# Patient Record
Sex: FEMALE | Race: WHITE | NOT HISPANIC OR LATINO
[De-identification: names, ages, dates, MRNs, and addresses within clinical notes are randomized per-mention and may not be internally consistent; named-entity substitution may affect disease eponyms.]

---

## 2019-06-04 ENCOUNTER — FORM ENCOUNTER (OUTPATIENT)
Age: 64
End: 2019-06-04

## 2019-12-01 ENCOUNTER — FORM ENCOUNTER (OUTPATIENT)
Age: 64
End: 2019-12-01

## 2019-12-03 ENCOUNTER — FORM ENCOUNTER (OUTPATIENT)
Age: 64
End: 2019-12-03

## 2020-06-08 ENCOUNTER — FORM ENCOUNTER (OUTPATIENT)
Age: 65
End: 2020-06-08

## 2020-06-16 ENCOUNTER — FORM ENCOUNTER (OUTPATIENT)
Age: 65
End: 2020-06-16

## 2020-12-15 ENCOUNTER — FORM ENCOUNTER (OUTPATIENT)
Age: 65
End: 2020-12-15

## 2021-06-23 ENCOUNTER — APPOINTMENT (OUTPATIENT)
Dept: BREAST CENTER | Facility: CLINIC | Age: 66
End: 2021-06-23

## 2021-07-07 ENCOUNTER — RESULT REVIEW (OUTPATIENT)
Age: 66
End: 2021-07-07

## 2021-07-07 ENCOUNTER — APPOINTMENT (OUTPATIENT)
Dept: BREAST CENTER | Facility: CLINIC | Age: 66
End: 2021-07-07
Payer: COMMERCIAL

## 2021-07-07 VITALS
DIASTOLIC BLOOD PRESSURE: 79 MMHG | SYSTOLIC BLOOD PRESSURE: 133 MMHG | WEIGHT: 185 LBS | HEART RATE: 68 BPM | BODY MASS INDEX: 34.93 KG/M2 | HEIGHT: 61 IN

## 2021-07-07 DIAGNOSIS — Z86.79 PERSONAL HISTORY OF OTHER DISEASES OF THE CIRCULATORY SYSTEM: ICD-10-CM

## 2021-07-07 DIAGNOSIS — Z80.8 FAMILY HISTORY OF MALIGNANT NEOPLASM OF OTHER ORGANS OR SYSTEMS: ICD-10-CM

## 2021-07-07 DIAGNOSIS — N60.12 DIFFUSE CYSTIC MASTOPATHY OF LEFT BREAST: ICD-10-CM

## 2021-07-07 DIAGNOSIS — N60.11 DIFFUSE CYSTIC MASTOPATHY OF LEFT BREAST: ICD-10-CM

## 2021-07-07 DIAGNOSIS — Z80.3 FAMILY HISTORY OF MALIGNANT NEOPLASM OF BREAST: ICD-10-CM

## 2021-07-07 PROCEDURE — 99072 ADDL SUPL MATRL&STAF TM PHE: CPT

## 2021-07-07 PROCEDURE — 99213 OFFICE O/P EST LOW 20 MIN: CPT

## 2021-07-07 RX ORDER — BETAXOLOL HCL 20 MG
20 TABLET ORAL
Refills: 0 | Status: ACTIVE | COMMUNITY

## 2021-07-07 NOTE — REASON FOR VISIT
[Follow-Up: _____] : a [unfilled] follow-up visit [FreeTextEntry1] : The patient comes in with a family history of breast cancer and a history of fibrocystic mastopathy with bilateral breast biopsies performed in the past which have been benign.  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

## 2021-07-07 NOTE — ASSESSMENT
[FreeTextEntry1] : The patient is a 65-year-old  postmenopausal white female of Ashkenazi Confucianism descent.  She has no history of any hormone replacement therapy.  She has a strong family history of breast cancer with her sister with breast cancer at age 35 who was BRCA negative.  Her paternal grandmother had postmenopausal breast cancer.  The patient's had bilateral benign breast biopsies in the past by other surgeons.  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  She has a Pebbles model calculated 5-year risk of 3.2% and lifetime risk of 13.3%.  She was offered Evista or tamoxifen for risk reduction but refused given its risk/side effect profile.  On exam today, I cannot feel any suspicious densities in either breast.  She underwent a mammography and ultrasound here at [place] today on 2021 and I reviewed the films and these appear unchanged and negative.  The patient was reassured but I am awaiting the official radiologist report.  If that is unchanged, I can see her again in 6 months and she should continue her yearly mammography and ultrasound again in 2022.

## 2021-07-07 NOTE — PAST MEDICAL HISTORY
[Menarche Age ____] : age at menarche was [unfilled] [Menopause Age____] : age at menopause was [unfilled] [Age At Live Birth ___] : Age at live birth: [unfilled] [Postmenopausal] : The patient is postmenopausal [Total Preg ___] : G[unfilled] [Live Births ___] : P[unfilled]  [History of Hormone Replacement Treatment] : has no history of hormone replacement treatment

## 2021-07-07 NOTE — HISTORY OF PRESENT ILLNESS
[FreeTextEntry1] : The patient is a 65-year-old  postmenopausal white female of Ashkenazi Taoism descent.  She has no history of any hormone replacement therapy.  She has a strong family history of breast cancer with her sister with breast cancer at age 35 who was BRCA negative.  Her paternal grandmother had postmenopausal breast cancer.  The patient's had bilateral benign breast biopsies in the past by other surgeons.  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  She has a Pebbles model calculated 5-year risk of 3.2% and lifetime risk of 13.3%.  She was offered Evista or tamoxifen for risk reduction but refused given its risk/side effect profile.  She comes in for routine follow-up given her family history and history of fibrocystic mastopathy.

## 2021-07-07 NOTE — HISTORY OF PRESENT ILLNESS
[FreeTextEntry1] : The patient is a 65-year-old  postmenopausal white female of Ashkenazi Zoroastrian descent.  She has no history of any hormone replacement therapy.  She has a strong family history of breast cancer with her sister with breast cancer at age 35 who was BRCA negative.  Her paternal grandmother had postmenopausal breast cancer.  The patient's had bilateral benign breast biopsies in the past by other surgeons.  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  She has a Pebbles model calculated 5-year risk of 3.2% and lifetime risk of 13.3%.  She was offered Evista or tamoxifen for risk reduction but refused given its risk/side effect profile.  She comes in for routine follow-up given her family history and history of fibrocystic mastopathy.

## 2021-07-07 NOTE — PHYSICAL EXAM
[Normocephalic] : normocephalic [Atraumatic] : atraumatic [EOMI] : extra ocular movement intact [Supple] : supple [No Supraclavicular Adenopathy] : no supraclavicular adenopathy [No Cervical Adenopathy] : no cervical adenopathy [Examined in the supine and seated position] : examined in the supine and seated position [No dominant masses] : no dominant masses in right breast  [No dominant masses] : no dominant masses left breast [No Nipple Retraction] : no left nipple retraction [No Nipple Discharge] : no left nipple discharge [Breast Mass Right Breast ___cm] : no masses [Breast Mass Left Breast ___cm] : no masses [Breast Nipple Inversion] : nipples not inverted [Breast Nipple Retraction] : nipples not retracted [Breast Nipple Flattening] : nipples not flattened [Breast Nipple Fissures] : nipples not fissured [Breast Abnormal Lactation (Galactorrhea)] : no galactorrhea [Breast Abnormal Secretion Serous Fluid] : no serous discharge [Breast Abnormal Secretion Bloody Fluid] : no bloody discharge [Breast Abnormal Secretion Opalescent Fluid] : no milky discharge [No Axillary Lymphadenopathy] : no left axillary lymphadenopathy [No Edema] : no edema [No Rashes] : no rashes [No Ulceration] : no ulceration [de-identified] : On exam, the patient has large ptotic D-cup breasts.  On palpation I cannot feel any suspicious densities in either breast.  She has no axillary, supraclavicular, or cervical adenopathy.

## 2021-07-07 NOTE — PHYSICAL EXAM
[Normocephalic] : normocephalic [Atraumatic] : atraumatic [EOMI] : extra ocular movement intact [Supple] : supple [No Supraclavicular Adenopathy] : no supraclavicular adenopathy [No Cervical Adenopathy] : no cervical adenopathy [Examined in the supine and seated position] : examined in the supine and seated position [No dominant masses] : no dominant masses in right breast  [No dominant masses] : no dominant masses left breast [No Nipple Retraction] : no left nipple retraction [No Nipple Discharge] : no left nipple discharge [Breast Mass Right Breast ___cm] : no masses [Breast Mass Left Breast ___cm] : no masses [Breast Nipple Inversion] : nipples not inverted [Breast Nipple Retraction] : nipples not retracted [Breast Nipple Flattening] : nipples not flattened [Breast Nipple Fissures] : nipples not fissured [Breast Abnormal Lactation (Galactorrhea)] : no galactorrhea [Breast Abnormal Secretion Bloody Fluid] : no bloody discharge [Breast Abnormal Secretion Serous Fluid] : no serous discharge [Breast Abnormal Secretion Opalescent Fluid] : no milky discharge [No Axillary Lymphadenopathy] : no left axillary lymphadenopathy [No Edema] : no edema [No Rashes] : no rashes [No Ulceration] : no ulceration [de-identified] : On exam, the patient has large ptotic D-cup breasts.  On palpation I cannot feel any suspicious densities in either breast.  She has no axillary, supraclavicular, or cervical adenopathy.

## 2021-07-07 NOTE — ASSESSMENT
[FreeTextEntry1] : The patient is a 65-year-old  postmenopausal white female of Ashkenazi Amish descent.  She has no history of any hormone replacement therapy.  She has a strong family history of breast cancer with her sister with breast cancer at age 35 who was BRCA negative.  Her paternal grandmother had postmenopausal breast cancer.  The patient's had bilateral benign breast biopsies in the past by other surgeons.  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  She has a Pebbles model calculated 5-year risk of 3.2% and lifetime risk of 13.3%.  She was offered Evista or tamoxifen for risk reduction but refused given its risk/side effect profile.  On exam today, I cannot feel any suspicious densities in either breast.  She underwent a mammography and ultrasound here at [place] today on 2021 and I reviewed the films and these appear unchanged and negative.  The patient was reassured but I am awaiting the official radiologist report.  If that is unchanged, I can see her again in 6 months and she should continue her yearly mammography and ultrasound again in 2022.

## 2021-07-07 NOTE — REVIEW OF SYSTEMS
[Dry Eyes] : dryness of the eyes [Eyes Itch] : itching of the eyes [Loss Of Hearing] : hearing loss [As Noted in HPI] : as noted in HPI [Joint Pain] : joint pain [Joint Stiffness] : joint stiffness [Negative] : Integumentary

## 2022-01-02 NOTE — REVIEW OF SYSTEMS
[Dry Eyes] : dryness of the eyes [Eyes Itch] : itching of the eyes [Loss Of Hearing] : hearing loss [As Noted in HPI] : as noted in HPI [Joint Stiffness] : joint stiffness [Negative] : Heme/Lymph

## 2022-01-05 ENCOUNTER — APPOINTMENT (OUTPATIENT)
Dept: BREAST CENTER | Facility: CLINIC | Age: 67
End: 2022-01-05
Payer: MEDICARE

## 2022-01-05 VITALS
HEART RATE: 81 BPM | SYSTOLIC BLOOD PRESSURE: 151 MMHG | DIASTOLIC BLOOD PRESSURE: 92 MMHG | BODY MASS INDEX: 34.93 KG/M2 | HEIGHT: 61 IN | WEIGHT: 185 LBS

## 2022-01-05 PROCEDURE — 99213 OFFICE O/P EST LOW 20 MIN: CPT

## 2022-01-05 NOTE — ASSESSMENT
[FreeTextEntry1] : The patient is a 66-year-old  postmenopausal white female of Ashkenazi Anabaptism descent.  She has no history of any hormone replacement therapy.  She has a strong family history of breast cancer with her sister with breast cancer at age 35 who was BRCA negative.  Her paternal grandmother had postmenopausal breast cancer.  The patient's had bilateral benign breast biopsies in the past by other surgeons.  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  She has a Pebbles model calculated 5-year risk of 3.2% and lifetime risk of 13.3%.  She was offered Evista or tamoxifen for risk reduction but refused given its risk/side effect profile.  On exam today, I cannot feel any suspicious densities in either breast.  She underwent a mammography and ultrasound which I reviewed from [place] on 2021 and these showed no suspicious findings.  The patient was reassured and I can see her again in 6 months and she should continue her yearly mammography and ultrasound again in 2022 and she was given prescriptions.  I can start seeing her yearly after her next visit if everything is unchanged on her mammography and ultrasound.

## 2022-01-05 NOTE — PHYSICAL EXAM
[Normocephalic] : normocephalic [Atraumatic] : atraumatic [EOMI] : extra ocular movement intact [Supple] : supple [No Supraclavicular Adenopathy] : no supraclavicular adenopathy [No Cervical Adenopathy] : no cervical adenopathy [Examined in the supine and seated position] : examined in the supine and seated position [No dominant masses] : no dominant masses in right breast  [No dominant masses] : no dominant masses left breast [No Nipple Retraction] : no left nipple retraction [No Nipple Discharge] : no left nipple discharge [Breast Mass Right Breast ___cm] : no masses [Breast Mass Left Breast ___cm] : no masses [No Axillary Lymphadenopathy] : no left axillary lymphadenopathy [No Edema] : no edema [No Rashes] : no rashes [No Ulceration] : no ulceration [Breast Nipple Inversion] : nipples not inverted [Breast Nipple Retraction] : nipples not retracted [Breast Nipple Flattening] : nipples not flattened [Breast Nipple Fissures] : nipples not fissured [Breast Abnormal Lactation (Galactorrhea)] : no galactorrhea [Breast Abnormal Secretion Bloody Fluid] : no bloody discharge [Breast Abnormal Secretion Serous Fluid] : no serous discharge [Breast Abnormal Secretion Opalescent Fluid] : no milky discharge [de-identified] : On exam, the patient has large ptotic D-cup breasts.  On palpation I cannot feel any suspicious densities in either breast.  She has no axillary, supraclavicular, or cervical adenopathy.

## 2022-08-03 ENCOUNTER — RESULT REVIEW (OUTPATIENT)
Age: 67
End: 2022-08-03

## 2022-08-03 ENCOUNTER — APPOINTMENT (OUTPATIENT)
Dept: BREAST CENTER | Facility: CLINIC | Age: 67
End: 2022-08-03

## 2022-08-03 VITALS — TEMPERATURE: 97.1 F | HEART RATE: 80 BPM | OXYGEN SATURATION: 100 %

## 2022-08-03 PROCEDURE — 99213 OFFICE O/P EST LOW 20 MIN: CPT

## 2022-08-03 NOTE — HISTORY OF PRESENT ILLNESS
[FreeTextEntry1] : The patient is a 66-year-old  postmenopausal white female of Ashkenazi Taoist descent.  She has no history of any hormone replacement therapy.  She has a strong family history of breast cancer with her sister with breast cancer at age 35 who was BRCA negative.  Her paternal grandmother had postmenopausal breast cancer.  The patient's had bilateral benign breast biopsies in the past by other surgeons.  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  She has a Pebbles model calculated 5-year risk of 3.2% and lifetime risk of 13.3%.  She was offered Evista or tamoxifen for risk reduction but refused given its risk/side effect profile.  She comes in for routine follow-up given her family history and history of fibrocystic mastopathy.

## 2022-08-03 NOTE — ASSESSMENT
[FreeTextEntry1] : The patient is a 66-year-old  postmenopausal white female of Ashkenazi Zoroastrian descent.  She has no history of any hormone replacement therapy.  She has a strong family history of breast cancer with her sister with breast cancer at age 35 who was BRCA negative.  Her paternal grandmother had postmenopausal breast cancer.  The patient's had bilateral benign breast biopsies in the past by other surgeons.  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  She has a Pebbles model calculated 5-year risk of 3.2% and lifetime risk of 13.3%.  She was offered Evista or tamoxifen for risk reduction but refused given its risk/side effect profile.  On exam today, I cannot feel any suspicious densities in either breast.  She underwent her last bilateral mammography and ultrasound which I reviewed from [place] performed today on August 3, 2022 which showed no suspicious findings.  The patient was reassured and I can see her again in 1 year and she should continue her yearly mammography and ultrasound again in 2023 and she was given prescriptions.  She will stagger her exams with her gynecologist to get 6-month clinical breast exams.

## 2022-08-03 NOTE — PHYSICAL EXAM
[Normocephalic] : normocephalic [Atraumatic] : atraumatic [EOMI] : extra ocular movement intact [Supple] : supple [No Supraclavicular Adenopathy] : no supraclavicular adenopathy [No Cervical Adenopathy] : no cervical adenopathy [Examined in the supine and seated position] : examined in the supine and seated position [No dominant masses] : no dominant masses in right breast  [No dominant masses] : no dominant masses left breast [No Nipple Retraction] : no left nipple retraction [No Nipple Discharge] : no left nipple discharge [Breast Mass Right Breast ___cm] : no masses [Breast Mass Left Breast ___cm] : no masses [No Axillary Lymphadenopathy] : no left axillary lymphadenopathy [No Edema] : no edema [No Rashes] : no rashes [No Ulceration] : no ulceration [Breast Nipple Inversion] : nipples not inverted [Breast Nipple Retraction] : nipples not retracted [Breast Nipple Flattening] : nipples not flattened [Breast Nipple Fissures] : nipples not fissured [Breast Abnormal Lactation (Galactorrhea)] : no galactorrhea [Breast Abnormal Secretion Bloody Fluid] : no bloody discharge [Breast Abnormal Secretion Serous Fluid] : no serous discharge [Breast Abnormal Secretion Opalescent Fluid] : no milky discharge [de-identified] : On exam, the patient has large ptotic D-cup breasts.  On palpation I cannot feel any suspicious densities in either breast.  She has no axillary, supraclavicular, or cervical adenopathy.

## 2023-08-07 ENCOUNTER — RESULT REVIEW (OUTPATIENT)
Age: 68
End: 2023-08-07

## 2023-08-07 ENCOUNTER — APPOINTMENT (OUTPATIENT)
Dept: BREAST CENTER | Facility: CLINIC | Age: 68
End: 2023-08-07
Payer: MEDICARE

## 2023-08-07 DIAGNOSIS — N60.11 DIFFUSE CYSTIC MASTOPATHY OF LEFT BREAST: ICD-10-CM

## 2023-08-07 DIAGNOSIS — N60.12 DIFFUSE CYSTIC MASTOPATHY OF LEFT BREAST: ICD-10-CM

## 2023-08-07 DIAGNOSIS — R92.2 INCONCLUSIVE MAMMOGRAM: ICD-10-CM

## 2023-08-07 DIAGNOSIS — Z80.3 FAMILY HISTORY OF MALIGNANT NEOPLASM OF BREAST: ICD-10-CM

## 2023-08-07 PROCEDURE — 99213 OFFICE O/P EST LOW 20 MIN: CPT

## 2023-08-07 NOTE — ASSESSMENT
[FreeTextEntry1] : The patient is a 67-year-old  postmenopausal white female of Ashkenazi [place] descent.  She has no history of any hormone replacement therapy.  She has a strong family history of breast cancer with her sister with breast cancer at age 35 who was BRCA negative.  Her paternal grandmother had postmenopausal breast cancer.  The patient's had bilateral benign breast biopsies in the past by other surgeons.  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  She has a Pebbles model calculated 5-year risk of 3.2% and lifetime risk of 13.3%.  She was offered Evista or tamoxifen for risk reduction but refused given its risk/side effect profile.  On exam today, I cannot feel any suspicious densities in either breast.  She underwent her last bilateral mammography and ultrasound which I reviewed from [place] performed today on 2022 which showed no suspicious findings.  The patient was reassured and I can see her again in 1 year and she should continue her yearly mammography and ultrasound again in 2024 and she was given prescriptions.  She will stagger her exams with her gynecologist to get 6-month clinical breast exams.

## 2023-08-07 NOTE — HISTORY OF PRESENT ILLNESS
[FreeTextEntry1] : The patient is a 67-year-old  postmenopausal white female of Ashkenazi Methodist descent.  She has no history of any hormone replacement therapy.  She has a strong family history of breast cancer with her sister with breast cancer at age 35 who was BRCA negative.  Her paternal grandmother had postmenopausal breast cancer.  The patient's had bilateral benign breast biopsies in the past by other surgeons.  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  She has a Pebbles model calculated 5-year risk of 3.2% and lifetime risk of 13.3%.  She was offered Evista or tamoxifen for risk reduction but refused given its risk/side effect profile.  She comes in for routine follow-up given her family history and history of fibrocystic mastopathy.

## 2023-08-07 NOTE — PHYSICAL EXAM
[Normocephalic] : normocephalic [Atraumatic] : atraumatic [EOMI] : extra ocular movement intact [Supple] : supple [No Supraclavicular Adenopathy] : no supraclavicular adenopathy [No Cervical Adenopathy] : no cervical adenopathy [Examined in the supine and seated position] : examined in the supine and seated position [No dominant masses] : no dominant masses in right breast  [No dominant masses] : no dominant masses left breast [No Nipple Retraction] : no left nipple retraction [No Nipple Discharge] : no left nipple discharge [Breast Mass Right Breast ___cm] : no masses [Breast Mass Left Breast ___cm] : no masses [No Axillary Lymphadenopathy] : no left axillary lymphadenopathy [No Edema] : no edema [No Rashes] : no rashes [No Ulceration] : no ulceration [Breast Nipple Inversion] : nipples not inverted [Breast Nipple Retraction] : nipples not retracted [Breast Nipple Flattening] : nipples not flattened [Breast Nipple Fissures] : nipples not fissured [Breast Abnormal Lactation (Galactorrhea)] : no galactorrhea [Breast Abnormal Secretion Bloody Fluid] : no bloody discharge [Breast Abnormal Secretion Serous Fluid] : no serous discharge [Breast Abnormal Secretion Opalescent Fluid] : no milky discharge [de-identified] : On exam, the patient has large ptotic D-cup breasts.  On palpation I cannot feel any suspicious densities in either breast.  She has no axillary, supraclavicular, or cervical adenopathy.

## 2024-07-31 ENCOUNTER — NON-APPOINTMENT (OUTPATIENT)
Age: 69
End: 2024-07-31

## 2024-07-31 NOTE — PHYSICAL EXAM
[Normocephalic] : normocephalic [Atraumatic] : atraumatic [EOMI] : extra ocular movement intact [Supple] : supple [No Supraclavicular Adenopathy] : no supraclavicular adenopathy [No Cervical Adenopathy] : no cervical adenopathy [Examined in the supine and seated position] : examined in the supine and seated position [No dominant masses] : no dominant masses in right breast  [No dominant masses] : no dominant masses left breast [No Nipple Retraction] : no left nipple retraction [No Nipple Discharge] : no left nipple discharge [Breast Mass Right Breast ___cm] : no masses [Breast Mass Left Breast ___cm] : no masses [Breast Nipple Inversion] : nipples not inverted [Breast Nipple Retraction] : nipples not retracted [Breast Nipple Flattening] : nipples not flattened [Breast Nipple Fissures] : nipples not fissured [Breast Abnormal Lactation (Galactorrhea)] : no galactorrhea [Breast Abnormal Secretion Bloody Fluid] : no bloody discharge [Breast Abnormal Secretion Serous Fluid] : no serous discharge [Breast Abnormal Secretion Opalescent Fluid] : no milky discharge [No Axillary Lymphadenopathy] : no left axillary lymphadenopathy [No Edema] : no edema [No Rashes] : no rashes [No Ulceration] : no ulceration [de-identified] : On exam, the patient has large ptotic D-cup breasts.  On palpation I cannot feel any suspicious densities in either breast.  She has no axillary, supraclavicular, or cervical adenopathy.

## 2024-07-31 NOTE — ASSESSMENT
[FreeTextEntry1] : The patient is a 68-year-old  postmenopausal white female of Ashkenazi Quaker descent.  She has no history of any hormone replacement therapy.  She has a strong family history of breast cancer with her sister with breast cancer at age 35 who was BRCA negative.  Her paternal grandmother had postmenopausal breast cancer.  The patient's had bilateral benign breast biopsies in the past by other surgeons.  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  She has a Pebbles model calculated 5-year risk of 3.2% and lifetime risk of 13.3%.  She was offered Evista or tamoxifen for risk reduction but refused given its risk/side effect profile.  On exam today, I cannot feel any suspicious densities in either breast.  She underwent her last bilateral mammography and ultrasound which I reviewed from [place] performed today on ?????? 2022 which showed no suspicious findings.  The patient was reassured and I can see her again in 1 year and she should continue her yearly mammography and ultrasound again in ????? 2024 and she was given prescriptions.  She will stagger her exams with her gynecologist to get 6-month clinical breast exams.

## 2024-07-31 NOTE — HISTORY OF PRESENT ILLNESS
[FreeTextEntry1] : The patient is a 68-year-old  postmenopausal white female of Ashkenazi Confucianist descent.  She has no history of any hormone replacement therapy.  She has a strong family history of breast cancer with her sister with breast cancer at age 35 who was BRCA negative.  Her paternal grandmother had postmenopausal breast cancer.  The patient's had bilateral benign breast biopsies in the past by other surgeons.  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  She has a Pebbles model calculated 5-year risk of 3.2% and lifetime risk of 13.3%.  She was offered Evista or tamoxifen for risk reduction but refused given its risk/side effect profile.  She comes in for routine follow-up given her family history and history of fibrocystic mastopathy.

## 2024-08-19 ENCOUNTER — RESULT REVIEW (OUTPATIENT)
Age: 69
End: 2024-08-19

## 2024-08-19 ENCOUNTER — APPOINTMENT (OUTPATIENT)
Dept: BREAST CENTER | Facility: CLINIC | Age: 69
End: 2024-08-19
Payer: MEDICARE

## 2024-08-19 VITALS — BODY MASS INDEX: 33.99 KG/M2 | WEIGHT: 180 LBS | HEIGHT: 61 IN

## 2024-08-19 DIAGNOSIS — N60.12 DIFFUSE CYSTIC MASTOPATHY OF LEFT BREAST: ICD-10-CM

## 2024-08-19 DIAGNOSIS — Z12.39 ENCOUNTER FOR OTHER SCREENING FOR MALIGNANT NEOPLASM OF BREAST: ICD-10-CM

## 2024-08-19 DIAGNOSIS — Z80.3 FAMILY HISTORY OF MALIGNANT NEOPLASM OF BREAST: ICD-10-CM

## 2024-08-19 DIAGNOSIS — N60.11 DIFFUSE CYSTIC MASTOPATHY OF LEFT BREAST: ICD-10-CM

## 2024-08-19 PROCEDURE — 99212 OFFICE O/P EST SF 10 MIN: CPT

## 2024-08-19 PROCEDURE — G2211 COMPLEX E/M VISIT ADD ON: CPT

## 2024-08-19 RX ORDER — DEXTROMETHORPHAN HBR 30 MG
CAPSULE ORAL
Refills: 0 | Status: ACTIVE | COMMUNITY

## 2024-08-19 NOTE — PHYSICAL EXAM
[Normocephalic] : normocephalic [Atraumatic] : atraumatic [EOMI] : extra ocular movement intact [Supple] : supple [No Supraclavicular Adenopathy] : no supraclavicular adenopathy [No Cervical Adenopathy] : no cervical adenopathy [Examined in the supine and seated position] : examined in the supine and seated position [No dominant masses] : no dominant masses in right breast  [No dominant masses] : no dominant masses left breast [No Nipple Retraction] : no left nipple retraction [No Nipple Discharge] : no left nipple discharge [Breast Mass Right Breast ___cm] : no masses [Breast Mass Left Breast ___cm] : no masses [No Axillary Lymphadenopathy] : no left axillary lymphadenopathy [No Edema] : no edema [No Rashes] : no rashes [No Ulceration] : no ulceration [Breast Nipple Inversion] : nipples not inverted [Breast Nipple Retraction] : nipples not retracted [Breast Nipple Flattening] : nipples not flattened [Breast Nipple Fissures] : nipples not fissured [Breast Abnormal Lactation (Galactorrhea)] : no galactorrhea [Breast Abnormal Secretion Bloody Fluid] : no bloody discharge [Breast Abnormal Secretion Serous Fluid] : no serous discharge [Breast Abnormal Secretion Opalescent Fluid] : no milky discharge [de-identified] : On exam, the patient has large ptotic D-cup breasts.  On palpation I cannot feel any suspicious densities in either breast.  She has no axillary, supraclavicular, or cervical adenopathy.

## 2024-08-19 NOTE — ASSESSMENT
[FreeTextEntry1] : The patient is a 68-year-old  postmenopausal white female of Ashkenazi Faith descent.  She has no history of any hormone replacement therapy.  She has a strong family history of breast cancer with her sister with breast cancer at age 35 who was BRCA negative.  Her paternal grandmother had postmenopausal breast cancer.  The patient's had bilateral benign breast biopsies in the past by other surgeons.  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  She has a Pebbles model calculated 5-year risk of 3.2% and lifetime risk of 13.3%.  She was offered Evista or tamoxifen for risk reduction but refused given its risk/side effect profile.  On exam today, I cannot feel any suspicious densities in either breast.  She underwent her last bilateral mammography and ultrasound which I reviewed from [place] performed today on 2024 which showed no suspicious findings but I am awaiting the official radiology report.  The patient was reassured and I can see her again in 1 year and she should continue her yearly mammography and ultrasound again in 2024 and she was given prescriptions.  She will stagger her exams with her gynecologist to get 6-month clinical breast exams.

## 2024-08-19 NOTE — PHYSICAL EXAM
[Normocephalic] : normocephalic [Atraumatic] : atraumatic [EOMI] : extra ocular movement intact [Supple] : supple [No Supraclavicular Adenopathy] : no supraclavicular adenopathy [No Cervical Adenopathy] : no cervical adenopathy [Examined in the supine and seated position] : examined in the supine and seated position [No dominant masses] : no dominant masses in right breast  [No dominant masses] : no dominant masses left breast [No Nipple Retraction] : no left nipple retraction [No Nipple Discharge] : no left nipple discharge [Breast Mass Right Breast ___cm] : no masses [Breast Mass Left Breast ___cm] : no masses [No Axillary Lymphadenopathy] : no left axillary lymphadenopathy [No Edema] : no edema [No Rashes] : no rashes [No Ulceration] : no ulceration [Breast Nipple Inversion] : nipples not inverted [Breast Nipple Retraction] : nipples not retracted [Breast Nipple Fissures] : nipples not fissured [Breast Nipple Flattening] : nipples not flattened [Breast Abnormal Lactation (Galactorrhea)] : no galactorrhea [Breast Abnormal Secretion Bloody Fluid] : no bloody discharge [Breast Abnormal Secretion Serous Fluid] : no serous discharge [Breast Abnormal Secretion Opalescent Fluid] : no milky discharge [de-identified] : On exam, the patient has large ptotic D-cup breasts.  On palpation I cannot feel any suspicious densities in either breast.  She has no axillary, supraclavicular, or cervical adenopathy.

## 2024-08-19 NOTE — HISTORY OF PRESENT ILLNESS
[FreeTextEntry1] : The patient is a 68-year-old  postmenopausal white female of Ashkenazi Jehovah's witness descent.  She has no history of any hormone replacement therapy.  She has a strong family history of breast cancer with her sister with breast cancer at age 35 who was BRCA negative.  Her paternal grandmother had postmenopausal breast cancer.  The patient's had bilateral benign breast biopsies in the past by other surgeons.  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  She has a Pebbles model calculated 5-year risk of 3.2% and lifetime risk of 13.3%.  She was offered Evista or tamoxifen for risk reduction but refused given its risk/side effect profile.  She comes in for routine follow-up given her family history and history of fibrocystic mastopathy.

## 2024-08-19 NOTE — HISTORY OF PRESENT ILLNESS
[FreeTextEntry1] : The patient is a 68-year-old  postmenopausal white female of Ashkenazi Christianity descent.  She has no history of any hormone replacement therapy.  She has a strong family history of breast cancer with her sister with breast cancer at age 35 who was BRCA negative.  Her paternal grandmother had postmenopausal breast cancer.  The patient's had bilateral benign breast biopsies in the past by other surgeons.  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  She has a Pebbles model calculated 5-year risk of 3.2% and lifetime risk of 13.3%.  She was offered Evista or tamoxifen for risk reduction but refused given its risk/side effect profile.  She comes in for routine follow-up given her family history and history of fibrocystic mastopathy.

## 2024-08-19 NOTE — ASSESSMENT
[FreeTextEntry1] : The patient is a 68-year-old  postmenopausal white female of Ashkenazi Mandaeism descent.  She has no history of any hormone replacement therapy.  She has a strong family history of breast cancer with her sister with breast cancer at age 35 who was BRCA negative.  Her paternal grandmother had postmenopausal breast cancer.  The patient's had bilateral benign breast biopsies in the past by other surgeons.  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  She has a Pebbles model calculated 5-year risk of 3.2% and lifetime risk of 13.3%.  She was offered Evista or tamoxifen for risk reduction but refused given its risk/side effect profile.  On exam today, I cannot feel any suspicious densities in either breast.  She underwent her last bilateral mammography and ultrasound which I reviewed from [place] performed today on 2024 which showed no suspicious findings but I am awaiting the official radiology report.  The patient was reassured and I can see her again in 1 year and she should continue her yearly mammography and ultrasound again in 2024 and she was given prescriptions.  She will stagger her exams with her gynecologist to get 6-month clinical breast exams.

## 2024-10-23 ENCOUNTER — NON-APPOINTMENT (OUTPATIENT)
Age: 69
End: 2024-10-23

## 2024-10-24 ENCOUNTER — NON-APPOINTMENT (OUTPATIENT)
Age: 69
End: 2024-10-24

## 2025-09-10 DIAGNOSIS — R92.323 MAMMOGRAPHIC FIBROGLANDULAR DENSITY, BILATERAL BREASTS: ICD-10-CM

## 2025-09-10 DIAGNOSIS — Z12.31 ENCOUNTER FOR SCREENING MAMMOGRAM FOR MALIGNANT NEOPLASM OF BREAST: ICD-10-CM

## 2025-09-12 ENCOUNTER — NON-APPOINTMENT (OUTPATIENT)
Age: 70
End: 2025-09-12

## 2025-09-12 ENCOUNTER — RESULT REVIEW (OUTPATIENT)
Age: 70
End: 2025-09-12

## 2025-09-12 ENCOUNTER — APPOINTMENT (OUTPATIENT)
Dept: BREAST CENTER | Facility: CLINIC | Age: 70
End: 2025-09-12
Payer: MEDICARE

## 2025-09-12 DIAGNOSIS — Z80.3 FAMILY HISTORY OF MALIGNANT NEOPLASM OF BREAST: ICD-10-CM

## 2025-09-12 DIAGNOSIS — N60.11 DIFFUSE CYSTIC MASTOPATHY OF LEFT BREAST: ICD-10-CM

## 2025-09-12 DIAGNOSIS — N60.12 DIFFUSE CYSTIC MASTOPATHY OF LEFT BREAST: ICD-10-CM

## 2025-09-12 DIAGNOSIS — Z12.39 ENCOUNTER FOR OTHER SCREENING FOR MALIGNANT NEOPLASM OF BREAST: ICD-10-CM

## 2025-09-12 PROCEDURE — 99213 OFFICE O/P EST LOW 20 MIN: CPT
